# Patient Record
Sex: MALE | Race: ASIAN | NOT HISPANIC OR LATINO | URBAN - METROPOLITAN AREA
[De-identification: names, ages, dates, MRNs, and addresses within clinical notes are randomized per-mention and may not be internally consistent; named-entity substitution may affect disease eponyms.]

---

## 2021-12-11 ENCOUNTER — EMERGENCY (EMERGENCY)
Facility: HOSPITAL | Age: 36
LOS: 1 days | Discharge: HOME | End: 2021-12-11
Attending: EMERGENCY MEDICINE | Admitting: EMERGENCY MEDICINE
Payer: COMMERCIAL

## 2021-12-11 VITALS
SYSTOLIC BLOOD PRESSURE: 145 MMHG | HEART RATE: 91 BPM | WEIGHT: 156.53 LBS | TEMPERATURE: 99 F | DIASTOLIC BLOOD PRESSURE: 94 MMHG | RESPIRATION RATE: 18 BRPM | OXYGEN SATURATION: 96 %

## 2021-12-11 DIAGNOSIS — R05.9 COUGH, UNSPECIFIED: ICD-10-CM

## 2021-12-11 DIAGNOSIS — Z20.822 CONTACT WITH AND (SUSPECTED) EXPOSURE TO COVID-19: ICD-10-CM

## 2021-12-11 DIAGNOSIS — J06.9 ACUTE UPPER RESPIRATORY INFECTION, UNSPECIFIED: ICD-10-CM

## 2021-12-11 LAB — SARS-COV-2 RNA SPEC QL NAA+PROBE: NEGATIVE — SIGNIFICANT CHANGE UP

## 2021-12-11 PROCEDURE — 99284 EMERGENCY DEPT VISIT MOD MDM: CPT

## 2021-12-11 PROCEDURE — 87635 SARS-COV-2 COVID-19 AMP PRB: CPT

## 2021-12-11 PROCEDURE — 99283 EMERGENCY DEPT VISIT LOW MDM: CPT

## 2021-12-11 RX ORDER — FLUTICASONE PROPIONATE 50 MCG
1 SPRAY, SUSPENSION NASAL
Qty: 1 | Refills: 0
Start: 2021-12-11

## 2021-12-11 RX ADMIN — Medication 100 MILLIGRAM(S): at 12:33

## 2021-12-11 NOTE — ED PROVIDER NOTE - PATIENT PORTAL LINK FT
You can access the FollowMyHealth Patient Portal offered by [facility] by registering at the following website: [email or web address]. By joining [facility]’s FollowMyHealth portal, you will also be able to view your health information using other applications (apps) compatible with our system.

## 2021-12-11 NOTE — ED PROVIDER NOTE - NSFOLLOWUPINSTRUCTIONS_ED_ALL_ED_FT
URI    We are concerned you may have COVID.  Rest.  Drink plenty of fluids.  Try over the counter medications based on your symptoms for relief; use as directed: sudafed or similar and/or nasal sprays for congestion, robitussin or similar for cough, tylenol for fever, body aches, pain.     Take Tessalon Perles 1 tab every 8 hours as needed for cough.     Use flonase (fluticasone) -  1 spray each nostril twice a day for nasal congestion as needed.     Return for increased pain, increased sob, change in cough/sputum, increased fever, intractable vomiting, any other concerns.     ** Self-isolate for 10 days from the onset of your symptoms (extend your isolation if you are still having cough or fever at day 10 until you are cough and fever free for 24 hours) UNLESS your COVID test is negative.  Cough/sneeze into your elbow area.  Wash your hands after touching your face and avoid touching your face if able; if you are sharing spaces, clean surfaces after you touch them.     Viral Respiratory Infection    A viral respiratory infection is an illness that affects parts of the body used for breathing, like the lungs, nose, and throat. It is caused by a germ called a virus. Symptoms can include runny nose, coughing, sneezing, fatigue, body aches, sore throat, fever, or headache. Over the counter medicine can be used to manage the symptoms but the infection typically goes away on its own in 5 to 10 days.     SEEK IMMEDIATE MEDICAL CARE IF YOU HAVE ANY OF THE FOLLOWING SYMPTOMS: shortness of breath, chest pain, fever over 10 days, or lightheadedness/dizziness.

## 2021-12-11 NOTE — ED ADULT NURSE NOTE - OBJECTIVE STATEMENT
Pt presents to ED with c/o cough with yellowish sputum production and sore throat x 4 days. Pt states son was sick a week ago. Denies fever, chills, N/V/D, abd pain, CP, SOB.

## 2021-12-11 NOTE — ED PROVIDER NOTE - CLINICAL SUMMARY MEDICAL DECISION MAKING FREE TEXT BOX
Patient likely has a viral Upper respiratory infection; no evidence to suggest sepsis or meningitis;  The patient is non toxic appearing with no focal lung findings and acceptable oxygenation.  No increased wob or resp distress.  No further eval or treatment indicated at this time.  Supportive care including increased fluids and over the counter therapy was advised and discussed.  Pt counseled to quarantine x 10 d unless COVID neg.  Typical COVID course discussed; pt counseled to return for worsening ha, neck stiffness, cp, sob, fever, any other concerns.  Hand hygiene and isolation reviewed.

## 2021-12-11 NOTE — ED PROVIDER NOTE - OBJECTIVE STATEMENT
37 yo male c/o 4 d uri sx, cough occ w green sputum but usually dry w/o cp, sob, fever, myalgias, taste/smell issues.  Pt's son w similar sx x 1 wk, covid neg.  Pt has not had covid test.  + immunized for covid.  No meds at home for sx.

## 2023-12-20 NOTE — ED PROVIDER NOTE - DOMESTIC TRAVEL HIGH RISK QUESTION
0010 BS 69 , Glucagon 1 mg iv given. 0011 Solu cortef 100 mg iv given. 0016 Octreotide 100 mcg iv given. 0237 .  0341 D10 1/2 NS decreased to 100 ml/hr per Dr Dayanna Canas. 0550 .  0630 D10 1/2 NS decreased to 50 ml/hr. No